# Patient Record
Sex: FEMALE | Race: WHITE | URBAN - METROPOLITAN AREA
[De-identification: names, ages, dates, MRNs, and addresses within clinical notes are randomized per-mention and may not be internally consistent; named-entity substitution may affect disease eponyms.]

---

## 2021-10-15 ENCOUNTER — WEB ENCOUNTER (OUTPATIENT)
Dept: URBAN - METROPOLITAN AREA CLINIC 107 | Facility: CLINIC | Age: 26
End: 2021-10-15

## 2021-10-15 ENCOUNTER — OFFICE VISIT (OUTPATIENT)
Dept: URBAN - METROPOLITAN AREA CLINIC 107 | Facility: CLINIC | Age: 26
End: 2021-10-15
Payer: OTHER GOVERNMENT

## 2021-10-15 ENCOUNTER — DASHBOARD ENCOUNTERS (OUTPATIENT)
Age: 26
End: 2021-10-15

## 2021-10-15 VITALS
HEIGHT: 70 IN | SYSTOLIC BLOOD PRESSURE: 120 MMHG | BODY MASS INDEX: 28.23 KG/M2 | WEIGHT: 197.2 LBS | TEMPERATURE: 98 F | DIASTOLIC BLOOD PRESSURE: 92 MMHG

## 2021-10-15 DIAGNOSIS — R74.8 ELEVATED LIVER ENZYMES: ICD-10-CM

## 2021-10-15 DIAGNOSIS — R10.9 RIGHT SIDED ABDOMINAL PAIN: ICD-10-CM

## 2021-10-15 PROBLEM — 301717006: Status: ACTIVE | Noted: 2021-10-15

## 2021-10-15 PROBLEM — 285388000: Status: ACTIVE | Noted: 2021-10-15

## 2021-10-15 PROCEDURE — 99204 OFFICE O/P NEW MOD 45 MIN: CPT | Performed by: PHYSICIAN ASSISTANT

## 2021-10-15 RX ORDER — HYOSCYAMINE SULFATE 0.12 MG/1
1 TABLET UNDER THE TONGUE AND ALLOW TO DISSOLVE  AS NEEDED TABLET, ORALLY DISINTEGRATING ORAL
Qty: 90 | Refills: 1 | OUTPATIENT
Start: 2021-10-15 | End: 2021-12-13

## 2021-10-15 RX ORDER — PRAZOSIN HYDROCHLORIDE 1 MG/1
1 CAPSULE AT BEDTIME CAPSULE ORAL ONCE A DAY
Status: ACTIVE | COMMUNITY

## 2021-10-15 RX ORDER — METFORMIN ER 500 MG 500 MG/1
1 TABLET TABLET ORAL TWICE A DAY
Status: ACTIVE | COMMUNITY

## 2021-10-15 RX ORDER — TRAZODONE HYDROCHLORIDE 50 MG/1
1 TABLET AT BEDTIME TABLET, FILM COATED ORAL ONCE A DAY
Status: ACTIVE | COMMUNITY

## 2021-10-15 NOTE — HPI-TODAY'S VISIT:
Ms. Benjamin is a 26-year-old woman with a history of endometriosis, PCOS, and insulin resistance, referred by Dr. Laureano, presenting for evaluation of elevated liver enzymes.  A copy of this document will be sent to referring provider. Labs to date (8/19/2021): Alk phos 78, ALT 73, AST 29, T bili 0.3.  CBC revealed WBC 7.74, hematocrit 45.2, hemoglobin 15.2, and platelets 338. Along with her elevated LFTs, she reports waxing and waning right upper quadrant abdominal pain that she describes as a sharp stabbing sensation.  Her pain will typically radiate to her epigastric region.  The pain is occasionally alleviated with taking a hot bath.  She is unable to identify any dietary triggers that may cause her symptoms.  On occasion, the pain will cause her to become nauseous.  She reports infrequent episodes of vomiting.  She denies any history of this in the past.  She denies any fevers, chills, night sweats or unintentional weight loss.  She alternates between Tylenol and ibuprofen for migraines as well as arthralgias/myalgias.  She does not take any over-the-counter vitamins or supplements.  She reports very rare alcohol consumption.  Denies any history of any abdominal surgeries in the past.  Her abdominal complaints are unrelated to defecation.  She is having regular bowel movements daily that are normal consistency.  Denies red blood per rectum, melena or hematochezia. Family history is notable for maternal grandmother and uncle with renal cancer.  Denies any known family  history of colon cancer, pancreatic disease, liver disease or IBD. She has never had a colonoscopy or EGD in the past. Regarding her stress levels, she states that her  is in the .  They are currently on orders to move to [place] sometime in December.

## 2021-10-15 NOTE — PHYSICAL EXAM GASTROINTESTINAL
Abdomen,  soft, mild-moderate right sided abdominal tenderness with palpation, nondistended,  no guarding or rigidity,  no masses palpable,  normal bowel sounds

## 2021-10-20 LAB
ACTIN (SMOOTH MUSCLE) ANTIBODY: 5
ALBUMIN: 4.7
ALKALINE PHOSPHATASE: 82
ALT (SGPT): 48
ANA DIRECT: NEGATIVE
AST (SGOT): 27
BASO (ABSOLUTE): 0.1
BASOS: 1
BILIRUBIN, DIRECT: <0.1
BILIRUBIN, TOTAL: <0.2
BUN/CREATININE RATIO: 6
BUN: 5
CARBON DIOXIDE, TOTAL: 20
CHLORIDE: 103
CREATININE: 0.77
EGFR IF AFRICN AM: 125
EGFR IF NONAFRICN AM: 108
EOS (ABSOLUTE): 0.1
EOS: 1
GLUCOSE: 105
HEMATOCRIT: 45.6
HEMATOLOGY COMMENTS:: (no result)
HEMOGLOBIN: 15.3
HEP A AB, TOTAL: POSITIVE
HEP C VIRUS AB: <0.1
IMMATURE CELLS: (no result)
IMMATURE GRANS (ABS): 0
IMMATURE GRANULOCYTES: 0
LYMPHS (ABSOLUTE): 3.3
LYMPHS: 43
MCH: 29.1
MCHC: 33.6
MCV: 87
MITOCHONDRIAL (M2) ANTIBODY: <20
MONOCYTES(ABSOLUTE): 0.5
MONOCYTES: 6
NEUTROPHILS (ABSOLUTE): 3.7
NEUTROPHILS: 49
NRBC: (no result)
PLATELETS: 396
POTASSIUM: 4.1
PROTEIN, TOTAL: 7.1
RBC: 5.26
RDW: 11.6
SODIUM: 139
WBC: 7.6

## 2021-11-04 ENCOUNTER — OFFICE VISIT (OUTPATIENT)
Dept: URBAN - METROPOLITAN AREA CLINIC 107 | Facility: CLINIC | Age: 26
End: 2021-11-04

## 2021-11-04 RX ORDER — TRAZODONE HYDROCHLORIDE 50 MG/1
1 TABLET AT BEDTIME TABLET, FILM COATED ORAL ONCE A DAY
Status: ACTIVE | COMMUNITY

## 2021-11-04 RX ORDER — PRAZOSIN HYDROCHLORIDE 1 MG/1
1 CAPSULE AT BEDTIME CAPSULE ORAL ONCE A DAY
Status: ACTIVE | COMMUNITY

## 2021-11-04 RX ORDER — METFORMIN ER 500 MG 500 MG/1
1 TABLET TABLET ORAL TWICE A DAY
Status: ACTIVE | COMMUNITY

## 2021-11-04 RX ORDER — HYOSCYAMINE SULFATE 0.12 MG/1
1 TABLET UNDER THE TONGUE AND ALLOW TO DISSOLVE  AS NEEDED TABLET, ORALLY DISINTEGRATING ORAL
Qty: 90 | Refills: 1 | Status: ACTIVE | COMMUNITY
Start: 2021-10-15 | End: 2021-12-13

## 2021-11-08 ENCOUNTER — TELEPHONE ENCOUNTER (OUTPATIENT)
Dept: URBAN - METROPOLITAN AREA CLINIC 113 | Facility: CLINIC | Age: 26
End: 2021-11-08